# Patient Record
Sex: FEMALE | Race: OTHER | HISPANIC OR LATINO
[De-identification: names, ages, dates, MRNs, and addresses within clinical notes are randomized per-mention and may not be internally consistent; named-entity substitution may affect disease eponyms.]

---

## 2022-08-09 PROBLEM — Z00.00 ENCOUNTER FOR PREVENTIVE HEALTH EXAMINATION: Status: ACTIVE | Noted: 2022-08-09

## 2022-08-10 ENCOUNTER — APPOINTMENT (OUTPATIENT)
Dept: SURGERY | Facility: CLINIC | Age: 37
End: 2022-08-10

## 2022-08-10 VITALS
DIASTOLIC BLOOD PRESSURE: 68 MMHG | BODY MASS INDEX: 38.45 KG/M2 | HEART RATE: 81 BPM | HEIGHT: 67 IN | WEIGHT: 245 LBS | SYSTOLIC BLOOD PRESSURE: 100 MMHG | OXYGEN SATURATION: 98 %

## 2022-08-10 DIAGNOSIS — Z78.9 OTHER SPECIFIED HEALTH STATUS: ICD-10-CM

## 2022-08-10 DIAGNOSIS — Z83.3 FAMILY HISTORY OF DIABETES MELLITUS: ICD-10-CM

## 2022-08-10 DIAGNOSIS — Z86.718 PERSONAL HISTORY OF OTHER VENOUS THROMBOSIS AND EMBOLISM: ICD-10-CM

## 2022-08-10 PROCEDURE — 99204 OFFICE O/P NEW MOD 45 MIN: CPT

## 2022-08-10 NOTE — CONSULT LETTER
[Dear  ___] : Dear  [unfilled], [Consult Letter:] : I had the pleasure of evaluating your patient, [unfilled]. [Please see my note below.] : Please see my note below. [Consult Closing:] : Thank you very much for allowing me to participate in the care of this patient.  If you have any questions, please do not hesitate to contact me. [Sincerely,] : Sincerely, [FreeTextEntry3] : Osmin

## 2022-08-10 NOTE — HISTORY OF PRESENT ILLNESS
[de-identified] : Ms. KRZYSZTOF HOWELL  presents today  for bariatric surgery consultation. she has a long-standing history of severe obesity refractory to multiple prior attempts at weight loss including conservative treatments of diet and exercise.  Ms. KRZYSZTOF HOWELL has been obese since she had her child 6 years ago and the most weight that she  has been able to lose by any means is negligible.   Previous weight loss efforts include: Calorie-counting, restricted intake. Ms. HOWELL has limited capacity for exercise due to excess weight.  she  feels that weight loss surgery is the only option at this point for effective and clinically meaningful weight loss. she  is interested in gastric bypass. \par \par Patient also has a history of DVT, which was thought to be attributable to transdermal contraceptive patches.  She was followed and had a full work-up by hematologist which was negative for any factor deficiencies or other hematologic cause.  She is no longer on any anticoagulation.

## 2022-08-10 NOTE — ASSESSMENT
[FreeTextEntry1] : \par Patient with a BMI of * which places patient in the morbidly obese category.  Patient also suffers from ***  .  This has directly contributed to patient's  current medical conditions as well as, a decreased quality of life.  Patient has very little chance of successfully losing and maintaining a significant amount of weight with non-surgical management, and would benefit from surgical intervention.  Patient meets the criteria for weight loss surgery as defined in the NIH consensus statement, surgery is medically necessary. \par Given patient’s current BMI and obesity-related comorbidities, I feel the patient is a candidate for and would benefit from weight loss surgery, as all prior attempts by non-surgical means have been futile.\par \par VTE Risk Calculation:\par \par Does patient have PERSONAL history of VTE? Y\par Does patient have PERSONAL history of myocardial infarction, cardiac stent, or acute coronary syndrome? N\par \par Does patient have FAMILY history of VTE? N\par Does patient have FAMILY history of myocardial infarction, cardiac stent, or acute coronary syndrome? N\par \par \par \par

## 2022-08-10 NOTE — REVIEW OF SYSTEMS
[Recent Change In Weight] : ~T recent weight change [Negative] : Allergic/Immunologic [Patient Intake Form Reviewed] : Patient intake form was reviewed [Fever] : no fever [Chills] : no chills [Dysphagia] : no dysphagia [Odynophagia] : no odynophagia [Abdominal Pain] : no abdominal pain [Reflux/Heartburn] : no reflux/ heartburn

## 2022-08-10 NOTE — PHYSICAL EXAM
[Obese, well nourished, in no acute distress] : obese, well nourished, in no acute distress [Normal] : affect appropriate [de-identified] : Obese, protuberant. Soft, nontender, nondistended, no rebound or guarding.

## 2022-08-10 NOTE — PLAN
[FreeTextEntry1] : \par I have had a lengthy conversation with the patient and have discussed my impression and treatment plan with the patient.  \par The risks, benefits and alternatives, including laparoscopic gastric bypass, and laparoscopic vertical sleeve gastrectomy, were discussed at length and all of his questions were answered. The patient appears to understand and wishes to proceed.\par \par The patient was given the following instructions:\par \par 1.	Patient needs a complete medical evaluation including echocardiogram, stress test, pulmonary function test and evaluation for sleep apnea.\par \par 2.	Patient needs evaluation by GI including an upper endoscopy.\par \par 3.	Patient needs nutritional and psychological evaluations.\par \par 4.	Patient must attend a preoperative information meeting.\par \par 5.	Patient must undergo a right upper quadrant ultrasound, if there is no history of prior cholecystectomy.\par \par ·	Screening for Obstructive Sleep apnea .  Patient does meet criteria for polysomnography testing\par ·	Functional Assessment: Completely independent\par ·	Smoking:  Patient does not smoke.  Smoking cessation counseling/resources discussed (if indicated)\par ·	Substance Abuse:  Patient does not report use of illicit substances.  Counseling/resources discussed (if indicated)\par \par \par The weight loss surgery information packet as well as the nutrition education packet have been reviewed and given to the patient, and I provided and reviewed detailed documents addressing these same topics. I have provided literature about the procedures and encouraged the patient to further research the surgeries, and patient feels well informed.   I also provided patient with detailed information regarding the pre-operative requirements with respect to testing, medical, nutritional and psychological evaluations and documentation of same.  \par \par Also discussed was importance of taking supplements and attending regular follow-up.  I reviewed importance of behavioral modification and follow-up in order to optimize outcomes and avoid complications. The patient is aware of the expected length of stay and discharge plan for a sleeve gastrectomy.  I encouraged the patient to maintain a diet and exercise program to promote optimum health for the surgical procedure and post-op course. \par \par The patient clearly understood that surgery would only be scheduled if there are no medical or psychiatric contraindications and that follow up pre-operative office visits are required.  Patient agrees to begin a multi-disciplinary evaluation as discussed and provide required documentation and progress.  I informed patient that once all testing and evaluations (as deemed necessary) are completed, reviewed, and documentation received, this information to justify medical necessity for weight loss surgery will be submitted to insurance for pre-certification.  \par \par Patient will begin the pre-operative process with a visit to PCP, for whom detailed information regarding the necessary evaluations and documentation of obesity-related medical care was given to patient to share with PCP.  \par \par The patient had the opportunity to ask pertinent questions which were answered.  All of patient’s questions and concerns were addressed to patient’s satisfaction, and patient verbalized an understanding of the information discussed.\par

## 2022-09-13 ENCOUNTER — APPOINTMENT (OUTPATIENT)
Dept: GASTROENTEROLOGY | Facility: CLINIC | Age: 37
End: 2022-09-13

## 2022-09-13 VITALS
TEMPERATURE: 97.8 F | HEART RATE: 93 BPM | WEIGHT: 245 LBS | SYSTOLIC BLOOD PRESSURE: 126 MMHG | OXYGEN SATURATION: 97 % | DIASTOLIC BLOOD PRESSURE: 78 MMHG | BODY MASS INDEX: 38.45 KG/M2 | HEIGHT: 67 IN

## 2022-09-13 PROCEDURE — 99204 OFFICE O/P NEW MOD 45 MIN: CPT

## 2022-09-13 NOTE — PHYSICAL EXAM
[Alert] : alert [Normal Voice/Communication] : normal voice/communication [Healthy Appearing] : healthy appearing [No Acute Distress] : no acute distress [Sclera] : the sclera and conjunctiva were normal [Hearing Threshold Finger Rub Not [place]] : hearing was normal [Normal Lips/Gums] : the lips and gums were normal [Oropharynx] : the oropharynx was normal [Normal Appearance] : the appearance of the neck was normal [No Neck Mass] : no neck mass was observed [No Respiratory Distress] : no respiratory distress [No Acc Muscle Use] : no accessory muscle use [Respiration, Rhythm And Depth] : normal respiratory rhythm and effort [Auscultation Breath Sounds / Voice Sounds] : lungs were clear to auscultation bilaterally [Heart Rate And Rhythm] : heart rate was normal and rhythm regular [Normal S1, S2] : normal S1 and S2 [Murmurs] : no murmurs [Bowel Sounds] : normal bowel sounds [Abdomen Tenderness] : non-tender [No Masses] : no abdominal mass palpated [Abdomen Soft] : soft [] : no hepatosplenomegaly [Abnormal Walk] : normal gait [No Joint Swelling] : no joint swelling seen [Oriented To Time, Place, And Person] : oriented to person, place, and time

## 2022-09-20 NOTE — HISTORY OF PRESENT ILLNESS
[FreeTextEntry1] : This is a 36 year female here for bariatric surgery clearance. Referred to us by Dr. Doss. PMH of DM,HLD, ENZO. Denies a family history of colon CA, gastric CA, high risk polyps, Inflammatory and autoimmune disease. Patient denies any difficulty swallowing or reflux. No N&V or abdominal pain. No blood or dark tarry stool. Normal caliber. Weight stable. Never had an EGD or a colonoscopy\par Smoke/Etoh: none \par Wt  245\par BMI 38.37\par \par

## 2022-09-20 NOTE — ASSESSMENT
[FreeTextEntry1] : This is a 36 year female here for bariatric surgery clearance.\par \par My plan\par EGD\par Covid swab\par \par Risks, benefits, and alternatives of EGD  discussed at length with patient including but not limited to bleeding , perforation, anesthesia related complication, aspiration, etc. Patient expressed understanding.\par \par EGD for evaluation of polyps, tumors, nodules with +/- biopsy and or cauterization w/ and or w/o clipping. \par

## 2022-10-07 NOTE — ASU PATIENT PROFILE, ADULT - FALL HARM RISK - UNIVERSAL INTERVENTIONS
Bed in lowest position, wheels locked, appropriate side rails in place/Call bell, personal items and telephone in reach/Instruct patient to call for assistance before getting out of bed or chair/Non-slip footwear when patient is out of bed/[place] to call system/Physically safe environment - no spills, clutter or unnecessary equipment/Purposeful Proactive Rounding/Room/bathroom lighting operational, light cord in reach

## 2022-10-10 ENCOUNTER — TRANSCRIPTION ENCOUNTER (OUTPATIENT)
Age: 37
End: 2022-10-10

## 2022-10-10 ENCOUNTER — APPOINTMENT (OUTPATIENT)
Dept: GASTROENTEROLOGY | Facility: HOSPITAL | Age: 37
End: 2022-10-10

## 2022-10-10 ENCOUNTER — OUTPATIENT (OUTPATIENT)
Dept: OUTPATIENT SERVICES | Facility: HOSPITAL | Age: 37
LOS: 1 days | End: 2022-10-10
Payer: COMMERCIAL

## 2022-10-10 ENCOUNTER — APPOINTMENT (OUTPATIENT)
Dept: BARIATRICS | Facility: CLINIC | Age: 37
End: 2022-10-10

## 2022-10-10 VITALS
TEMPERATURE: 98 F | DIASTOLIC BLOOD PRESSURE: 72 MMHG | HEART RATE: 90 BPM | RESPIRATION RATE: 16 BRPM | OXYGEN SATURATION: 98 % | SYSTOLIC BLOOD PRESSURE: 128 MMHG | WEIGHT: 244.93 LBS | HEIGHT: 67 IN

## 2022-10-10 VITALS
SYSTOLIC BLOOD PRESSURE: 109 MMHG | OXYGEN SATURATION: 98 % | HEART RATE: 92 BPM | RESPIRATION RATE: 19 BRPM | DIASTOLIC BLOOD PRESSURE: 62 MMHG

## 2022-10-10 DIAGNOSIS — Z98.891 HISTORY OF UTERINE SCAR FROM PREVIOUS SURGERY: Chronic | ICD-10-CM

## 2022-10-10 DIAGNOSIS — E66.01 MORBID (SEVERE) OBESITY DUE TO EXCESS CALORIES: ICD-10-CM

## 2022-10-10 DIAGNOSIS — Z98.890 OTHER SPECIFIED POSTPROCEDURAL STATES: Chronic | ICD-10-CM

## 2022-10-10 DIAGNOSIS — Z90.89 ACQUIRED ABSENCE OF OTHER ORGANS: Chronic | ICD-10-CM

## 2022-10-10 LAB
GLUCOSE BLDC GLUCOMTR-MCNC: 105 MG/DL — HIGH (ref 70–99)
HCG UR QL: NEGATIVE — SIGNIFICANT CHANGE UP
SARS-COV-2 N GENE NPH QL NAA+PROBE: NOT DETECTED

## 2022-10-10 PROCEDURE — 88305 TISSUE EXAM BY PATHOLOGIST: CPT

## 2022-10-10 PROCEDURE — 88312 SPECIAL STAINS GROUP 1: CPT | Mod: 26

## 2022-10-10 PROCEDURE — 88312 SPECIAL STAINS GROUP 1: CPT

## 2022-10-10 PROCEDURE — 82962 GLUCOSE BLOOD TEST: CPT

## 2022-10-10 PROCEDURE — 43239 EGD BIOPSY SINGLE/MULTIPLE: CPT

## 2022-10-10 PROCEDURE — 88305 TISSUE EXAM BY PATHOLOGIST: CPT | Mod: 26

## 2022-10-10 PROCEDURE — 81025 URINE PREGNANCY TEST: CPT

## 2022-10-10 PROCEDURE — ZZZZZ: CPT

## 2022-10-10 RX ORDER — SODIUM CHLORIDE 9 MG/ML
500 INJECTION INTRAMUSCULAR; INTRAVENOUS; SUBCUTANEOUS
Refills: 0 | Status: DISCONTINUED | OUTPATIENT
Start: 2022-10-10 | End: 2022-10-24

## 2022-10-10 NOTE — ASU DISCHARGE PLAN (ADULT/PEDIATRIC) - NS MD DC FALL RISK RISK
For information on Fall & Injury Prevention, visit: [email or web address] OR  [email or web address] OR  https://www.cdc.gov/steadi/patient.html

## 2022-10-12 PROBLEM — E78.5 HYPERLIPIDEMIA, UNSPECIFIED: Chronic | Status: ACTIVE | Noted: 2022-10-07

## 2022-10-12 PROBLEM — E11.9 TYPE 2 DIABETES MELLITUS WITHOUT COMPLICATIONS: Chronic | Status: ACTIVE | Noted: 2022-10-07

## 2022-10-12 LAB — SURGICAL PATHOLOGY STUDY: SIGNIFICANT CHANGE UP

## 2022-11-10 ENCOUNTER — APPOINTMENT (OUTPATIENT)
Dept: BARIATRICS | Facility: CLINIC | Age: 37
End: 2022-11-10

## 2022-11-10 PROCEDURE — ZZZZZ: CPT

## 2022-11-21 ENCOUNTER — NON-APPOINTMENT (OUTPATIENT)
Age: 37
End: 2022-11-21

## 2022-11-21 ENCOUNTER — APPOINTMENT (OUTPATIENT)
Dept: CARDIOLOGY | Facility: CLINIC | Age: 37
End: 2022-11-21

## 2022-11-21 VITALS
DIASTOLIC BLOOD PRESSURE: 81 MMHG | BODY MASS INDEX: 38.45 KG/M2 | SYSTOLIC BLOOD PRESSURE: 138 MMHG | WEIGHT: 245 LBS | OXYGEN SATURATION: 99 % | HEIGHT: 67 IN | HEART RATE: 118 BPM | TEMPERATURE: 97.3 F

## 2022-11-21 DIAGNOSIS — Z01.818 ENCOUNTER FOR OTHER PREPROCEDURAL EXAMINATION: ICD-10-CM

## 2022-11-21 DIAGNOSIS — E78.00 PURE HYPERCHOLESTEROLEMIA, UNSPECIFIED: ICD-10-CM

## 2022-11-21 PROCEDURE — 99204 OFFICE O/P NEW MOD 45 MIN: CPT | Mod: 25

## 2022-11-21 PROCEDURE — 93000 ELECTROCARDIOGRAM COMPLETE: CPT | Mod: NC

## 2022-11-23 PROBLEM — Z01.818 PRE-OP TESTING: Status: ACTIVE | Noted: 2022-09-13

## 2022-11-23 PROBLEM — E78.00 HIGH CHOLESTEROL: Status: ACTIVE | Noted: 2022-08-10

## 2022-11-23 RX ORDER — ALBUTEROL SULFATE 90 UG/1
108 (90 BASE) INHALANT RESPIRATORY (INHALATION)
Qty: 7 | Refills: 0 | Status: ACTIVE | COMMUNITY
Start: 2022-09-28

## 2022-11-23 NOTE — ASSESSMENT
[FreeTextEntry1] : 38 yo F with asthma, HLD, DM, DVT and ENZO who presents for preprocedural evaluation prior to bariatric surgery. Patient is euvolemic on exam, and reports being able to perform > 4 METs without symptoms.\par \par 1.  Preprocedural evaluation: Patient's Revised Cardiac Risk Index (RCRI) score is 0 (Class I risk) and Craft score is 0.12% risk. Patient is at low-intermediate risk of  adverse perioperative cardiac events undergoing intermediate risk surgery. No further cardiac testing is needed prior to patient's surgery.\par \par 2. HLD: On atorvastatin\par \par 3. Family history of SCD: While patient's mother having an MI at age 46 is possible, the word fulminant is frequently applied to myocarditis, which can result in a similar outcome although has less of a genetic component.\par – In this patient's case, she already underwent genetic testing and is currently on atorvastatin which is used in primary prevention of ASCVD.  As such, aside from lifestyle modifications including recommendations for diet and exercise, as well as her upcoming bariatric surgery which may positively impact her hyperlipidemia and diabetes, no additional action needs to be taken at this point.\par – Patient was referred to follow-up with preventive cardiology (can be done after her surgery) to see whether any additional intervention may be helpful\par – Patient was also instructed to follow-up here in the office after her surgery\par

## 2022-11-23 NOTE — HISTORY OF PRESENT ILLNESS
[FreeTextEntry1] : 36 yo F with asthma, HLD, DM, and DVT in 2019 (negative work-up by hematology), obesity, and ENZO pending CPAP machine who presents for preprocedural evaluation prior to bariatric surgery.  Patient's family history is noted for her mother having sudden cardiac death at age 46.  Patient reports that she previously underwent an echocardiogram as well as genetic testing at [place], without any positive findings. \par \par Patient states that her mother had an MI, based on an autopsy that was performed in her home country.  The patient remembers seeing the word "fulminant" in the report, which is frequently applied to "fulminant myocarditis."\par \par Patient reports he feels well overall, and is able to use her stationary bike for up to 20 minutes at a time without feeling any chest pain, dyspnea, palpitations, lightheadedness, or syncope. Denies LE edema, orthopnea, or PND. Lives in a 4th floor walk-up and ascends multiple flights of stairs daily without issue.\par \par

## 2023-01-04 ENCOUNTER — APPOINTMENT (OUTPATIENT)
Dept: SURGERY | Facility: CLINIC | Age: 38
End: 2023-01-04
Payer: COMMERCIAL

## 2023-01-04 VITALS
WEIGHT: 252 LBS | BODY MASS INDEX: 39.55 KG/M2 | HEART RATE: 123 BPM | HEIGHT: 67 IN | SYSTOLIC BLOOD PRESSURE: 119 MMHG | DIASTOLIC BLOOD PRESSURE: 74 MMHG

## 2023-01-04 DIAGNOSIS — E11.9 TYPE 2 DIABETES MELLITUS W/OUT COMPLICATIONS: ICD-10-CM

## 2023-01-04 DIAGNOSIS — E66.01 MORBID (SEVERE) OBESITY DUE TO EXCESS CALORIES: ICD-10-CM

## 2023-01-04 DIAGNOSIS — K21.9 GASTRO-ESOPHAGEAL REFLUX DISEASE W/OUT ESOPHAGITIS: ICD-10-CM

## 2023-01-04 PROCEDURE — 99214 OFFICE O/P EST MOD 30 MIN: CPT

## 2023-01-04 NOTE — HISTORY OF PRESENT ILLNESS
[de-identified] : Patient is here for monthly pre-operative follow-up in preparation for bariatric surgery. Patient is making good food choices, working on eating smaller portions and becoming more mindful. Patient has made a concerted effort to eat more balanced and nutritionally sound meals, and to engage in some level of physical activity on a regular basis. Patient continues to struggle with weight loss despite continuous concerted efforts since the prior visit. Patient presents for review and discussion of informed consent in preparation for planned sleeve gastrectomy. Patient  has completed all of the required pre-operative testing and evaluations, and is ready to proceed with a  gastric bypass.  [de-identified] : She recently had her IUD removed due to excessive uterine bleeding.  She has had no further issues with bleeding since the device was removed.

## 2023-01-04 NOTE — ASSESSMENT
[FreeTextEntry1] : Patient with BMI of 40 which places patient in the morbidly obese category. This has directly contributed to patient's current medical conditions as well as, a decreased quality of life. Patient has very little chance of successfully losing and maintaining a significant amount of weight with non-surgical management, and would benefit from surgical intervention. Patient meets the criteria for weight loss surgery as defined in the NIH consensus statement, surgery is medically necessary. \par Given patient’s current BMI and obesity-related comorbidities, I feel the patient remains a candidate for and would benefit from weight loss surgery, as all prior attempts by non-surgical means have been futile.  Patient has put a great deal of thought and consideration into weight loss surgery, and remains  committed to proceeding with weight loss surgery.\par \par

## 2023-01-04 NOTE — CONSULT LETTER
[Dear  ___] : Dear  [unfilled], [Courtesy Letter:] : I had the pleasure of seeing your patient, [unfilled], in my office today. [Please see my note below.] : Please see my note below. [Consult Closing:] : Thank you very much for allowing me to participate in the care of this patient.  If you have any questions, please do not hesitate to contact me. [Sincerely,] : Sincerely, [FreeTextEntry3] : Osmin

## 2023-01-04 NOTE — PHYSICAL EXAM
[Obese, well nourished, in no acute distress] : obese, well nourished, in no acute distress [Normal] : affect appropriate [de-identified] : Obese, protuberant. Soft, nontender, nondistended, no rebound or guarding.

## 2023-01-04 NOTE — PLAN
[FreeTextEntry1] : I have had a lengthy and detailed conversation with the patient and have discussed my impressions and treatment plans with the patient. \par Informed consent and potential risks , benefits  and alternatives to the planned surgery were discussed in depth, including but not limited to:  bleeding, infection; seroma, hematoma, abscess; intestinal, gastric, vascular or other visceral or solid organ injury; leak;  injury to other adjacent or surrounding structures or organs; new or worsening reflux/heartburn; inability to complete the intended procedure or need to abort the procedure; inadequate weight loss or weight regain;  port site or incisional hernia; need for reoperation or other procedure; perioperative myocardial infarction, stroke, deep vein thrombosis, pulmonary embolus, pneumonia and death.  All surgical options were discussed including non-surgical treatments. The patient remains interested in a gastric bypass for weight loss. \par \par The weight loss surgery education packet as well as the nutrition education packet have been reviewed and given to the patient previously, and I also provided patient with detailed information regarding the post-operative instructions and expectations.  \par Also discussed was importance of taking supplements and attending regular follow-up. I reviewed importance of behavioral modification and follow-up in order to optimize outcomes and avoid complications. \par The patient is aware of the expected length of stay and discharge plan for the planned procedure. I encouraged the patient to maintain a diet and exercise program to promote optimum health for the surgical procedure and post-op course. \par I informed patient that all testing and evaluations and documentation have been received and reviewed.  This information to justify medical necessity for weight loss surgery will be submitted to insurance for pre-certification. \par \par We will plan to proceed with surgery at a mutually agreed-upon date, pending any required insurance pre-certification or pre-approval.  \par Patient agrees to obtain any outstanding necessary pre-operative evaluations and clearances that may be required for pre-surgical optimization.  Patient will also start the pre-operative diet at least two weeks prior to the scheduled surgery, and understands that failure to do so may result in an inability to perform the surgery as scheduled. \par \par The patient had the opportunity to ask pertinent questions, and all of patient’s questions and concerns were addressed to patient’s satisfaction.  Patient verbalized an understanding of the information discussed, and wishes to proceed with surgery. Informed consent for gastric bypass reviewed with and signed by patient.\par \par

## 2023-01-04 NOTE — DATA REVIEWED
[FreeTextEntry1] : Nutrition: Patient optimized\par Psychology: Patient optimized\par Cardiology: RCRI score of 0, no further testing necessary; patient optimized\par Pulmonary: PFTs normal, patient optimized\par GI: No hiatal hernia seen.  H. pylori negative\par \par \par  Patient:   KRZYSZTOF HOWELL\par Accession:                             [id number]\par \par Collected Date/Time:                   10/10/2022 08:38 EDT\par Received Date/Time:                    10/10/2022 18:00 EDT\par \par Surgical Pathology Report - Auth (Verified)\par \par Specimen(s) Submitted\par 1  Antrum biopsy\par \par Final Diagnosis\par \par Gastric antrum; biopsy:\par - Chronic inactive gastritis.\par - Cresyl violet stain is negative for H. pylori-like microorganisms.\par \par Verified by: Ivelisse Trujillo MD\par (Electronic Signature)\par Reported on: 10/12/22 12:39 EDT, [address]\par Phone: [phone]   Fax: [phone]\par _________________________________________________________________\par \par Clinical Information\par \par 36-year-old for prebariatric surgery\par \par Gross Description\par Received in formalin labeled   "antrum" are two, tan-pink, soft tissue\par fragments measuring 0.3 cm and 0.5 cm in greatest dimension. Entirely in\par one cassette: 1A.\par \par Luis M Severe 10/11/2022 01:11 PM\par \par Disclaimer\par In addition to other data that may appear on the specimen containers, all\par labels have been inspected to confirm the presence of the patient's name\par and date of birth.\par \par Histological processing performed at [facility], [place] [address].

## 2023-01-09 LAB
24R-OH-CALCIDIOL SERPL-MCNC: 55.9 PG/ML
ALBUMIN SERPL ELPH-MCNC: 4.6 G/DL
ALP BLD-CCNC: 88 U/L
ALT SERPL-CCNC: 60 U/L
ANION GAP SERPL CALC-SCNC: 14 MMOL/L
APTT BLD: 28.4 SEC
AST SERPL-CCNC: 37 U/L
BASOPHILS # BLD AUTO: 0.04 K/UL
BASOPHILS NFR BLD AUTO: 0.4 %
BILIRUB SERPL-MCNC: 0.7 MG/DL
BUN SERPL-MCNC: 4 MG/DL
CALCIUM SERPL-MCNC: 9.7 MG/DL
CALCIUM SERPL-MCNC: 9.7 MG/DL
CHLORIDE SERPL-SCNC: 104 MMOL/L
CHOLEST SERPL-MCNC: 161 MG/DL
CO2 SERPL-SCNC: 22 MMOL/L
CREAT SERPL-MCNC: 0.75 MG/DL
EGFR: 106 ML/MIN/1.73M2
EOSINOPHIL # BLD AUTO: 0.36 K/UL
EOSINOPHIL NFR BLD AUTO: 3.4 %
ESTIMATED AVERAGE GLUCOSE: 128 MG/DL
FOLATE SERPL-MCNC: 13.3 NG/ML
GLUCOSE SERPL-MCNC: 112 MG/DL
HBA1C MFR BLD HPLC: 6.1 %
HCT VFR BLD CALC: 39.1 %
HDLC SERPL-MCNC: 46 MG/DL
HGB BLD-MCNC: 12 G/DL
IMM GRANULOCYTES NFR BLD AUTO: 0.2 %
INR PPP: 1.13 RATIO
IRON SERPL-MCNC: 57 UG/DL
LDLC SERPL CALC-MCNC: 100 MG/DL
LYMPHOCYTES # BLD AUTO: 2.78 K/UL
LYMPHOCYTES NFR BLD AUTO: 26.4 %
MAGNESIUM SERPL-MCNC: 1.8 MG/DL
MAN DIFF?: NORMAL
MCHC RBC-ENTMCNC: 27.3 PG
MCHC RBC-ENTMCNC: 30.7 GM/DL
MCV RBC AUTO: 88.9 FL
MONOCYTES # BLD AUTO: 0.8 K/UL
MONOCYTES NFR BLD AUTO: 7.6 %
NEUTROPHILS # BLD AUTO: 6.52 K/UL
NEUTROPHILS NFR BLD AUTO: 62 %
NONHDLC SERPL-MCNC: 115 MG/DL
PARATHYROID HORMONE INTACT: 33 PG/ML
PLATELET # BLD AUTO: 317 K/UL
POTASSIUM SERPL-SCNC: 4.1 MMOL/L
PROT SERPL-MCNC: 7.7 G/DL
PT BLD: 13.1 SEC
RBC # BLD: 4.4 M/UL
RBC # FLD: 13.9 %
SODIUM SERPL-SCNC: 139 MMOL/L
TRIGL SERPL-MCNC: 73 MG/DL
TSH SERPL-ACNC: 1.38 UIU/ML
VIT B12 SERPL-MCNC: 644 PG/ML
WBC # FLD AUTO: 10.52 K/UL

## 2023-02-03 ENCOUNTER — OUTPATIENT (OUTPATIENT)
Dept: OUTPATIENT SERVICES | Facility: HOSPITAL | Age: 38
LOS: 1 days | End: 2023-02-03
Payer: COMMERCIAL

## 2023-02-03 VITALS
OXYGEN SATURATION: 100 % | HEIGHT: 67 IN | RESPIRATION RATE: 18 BRPM | SYSTOLIC BLOOD PRESSURE: 147 MMHG | WEIGHT: 240.97 LBS | HEART RATE: 84 BPM | TEMPERATURE: 98 F | DIASTOLIC BLOOD PRESSURE: 82 MMHG

## 2023-02-03 DIAGNOSIS — G47.33 OBSTRUCTIVE SLEEP APNEA (ADULT) (PEDIATRIC): ICD-10-CM

## 2023-02-03 DIAGNOSIS — J45.909 UNSPECIFIED ASTHMA, UNCOMPLICATED: ICD-10-CM

## 2023-02-03 DIAGNOSIS — K21.9 GASTRO-ESOPHAGEAL REFLUX DISEASE WITHOUT ESOPHAGITIS: ICD-10-CM

## 2023-02-03 DIAGNOSIS — E66.01 MORBID (SEVERE) OBESITY DUE TO EXCESS CALORIES: ICD-10-CM

## 2023-02-03 DIAGNOSIS — Z90.89 ACQUIRED ABSENCE OF OTHER ORGANS: Chronic | ICD-10-CM

## 2023-02-03 DIAGNOSIS — Z98.890 OTHER SPECIFIED POSTPROCEDURAL STATES: Chronic | ICD-10-CM

## 2023-02-03 DIAGNOSIS — Z01.818 ENCOUNTER FOR OTHER PREPROCEDURAL EXAMINATION: ICD-10-CM

## 2023-02-03 DIAGNOSIS — E78.5 HYPERLIPIDEMIA, UNSPECIFIED: ICD-10-CM

## 2023-02-03 DIAGNOSIS — Z98.891 HISTORY OF UTERINE SCAR FROM PREVIOUS SURGERY: Chronic | ICD-10-CM

## 2023-02-03 DIAGNOSIS — E11.9 TYPE 2 DIABETES MELLITUS WITHOUT COMPLICATIONS: ICD-10-CM

## 2023-02-03 LAB — BLD GP AB SCN SERPL QL: SIGNIFICANT CHANGE UP

## 2023-02-03 PROCEDURE — G0463: CPT

## 2023-02-03 RX ORDER — SODIUM CHLORIDE 9 MG/ML
3 INJECTION INTRAMUSCULAR; INTRAVENOUS; SUBCUTANEOUS EVERY 8 HOURS
Refills: 0 | Status: DISCONTINUED | OUTPATIENT
Start: 2023-02-13 | End: 2023-02-15

## 2023-02-03 NOTE — H&P PST ADULT - PSYCHIATRIC
details… normal/normal affect/alert and oriented x3/normal behavior normal affect/alert and oriented x3/normal behavior

## 2023-02-03 NOTE — H&P PST ADULT - PATIENT'S SEXUAL ORIENTATION
Abdomen , soft, nontender, nondistended , no guarding or rigidity , no masses palpable , normal bowel sounds , Liver and Spleen , no hepatomegaly present , no hepatosplenomegaly , liver nontender , spleen not palpable Heterosexual

## 2023-02-03 NOTE — H&P PST ADULT - ASSESSMENT
36 y/o female with h/o prediabetes, HLD, Vitamin D deficiency, ENZO    is diagnosed with morbid (severe) obesity due to excess calories. She is scheduled for robotic assisted gastric bypass on 2/13/23 38 y/o female with h/o asthma, GERD, T2DM, HLD, Vitamin D deficiency, ENZO (mild dx 10/2022 - not prescribed CPAP) is diagnosed with morbid (severe) obesity due to excess calories 36 y/o female with h/o asthma, GERD, Seasonal Allergies, T2DM, HLD, Vitamin D deficiency, ENZO (mild dx 10/2022 - not prescribed CPAP) is diagnosed with morbid (severe) obesity due to excess calories

## 2023-02-03 NOTE — H&P PST ADULT - PROBLEM SELECTOR PLAN 6
Sleep study completed 10/2022  Mild ENZO (pending CPAP)  Followed by pulmonologist - optimized for surgery  Recommend ENZO precautions perioperatively

## 2023-02-03 NOTE — H&P PST ADULT - GASTROINTESTINAL
details… soft/nontender/nondistended/normal active bowel sounds/no guarding/no rigidity/no organomegaly/no palpable enrique/no masses palpable

## 2023-02-03 NOTE — H&P PST ADULT - PROBLEM SELECTOR PLAN 1
Scheduled for robotic assisted gastric bypass on 2/13/23  Preoperative instructions discussed and given to patient.   Instructed to schedule COVID 19 test 3 days prior to surgery.   Discussed preprocedure skin preparation using  chlorhexidine gluconate 4% solution three days prior to  surgery.  Instructed patient to avoid aspirin and aspirin products, over the counter medications such as vitamins and herbal medications, one week prior to surgery.  Take Tylenol as needed for pain  Patient verbalized understanding of instructions

## 2023-02-03 NOTE — H&P PST ADULT - HISTORY OF PRESENT ILLNESS
36 y/o female with h/o prediabetes, HLD, Vitamin D deficiency, ENZO    is diagnosed with morbid (severe) obesity due to excess calories. She is scheduled for robotic assisted gastric bypass on 2/13/23 38 y/o female with h/o T2DM, HLD, Vitamin D deficiency, ENZO (mild - dx 10/2022) is diagnosed with morbid (severe) obesity due to excess calories. She is scheduled for robotic assisted gastric bypass on 2/13/23 36 y/o female with h/o mild persistent asthma, seasonal allergies, dyspnea on exertion associated with asthma, T2DM, HLD, Vitamin D deficiency, ENZO (mild - dx 10/2022, not prescribed cpap) DVT (while on birth-control with negative workup by hematology in 2019,  is diagnosed with morbid (severe) obesity due to excess calories. She is scheduled for robotic assisted gastric bypass on 2/13/23

## 2023-02-03 NOTE — H&P PST ADULT - NSICDXFAMILYHX_GEN_ALL_CORE_FT
FAMILY HISTORY:  Mother  Still living? Yes, Estimated age: Age Unknown  FHx: heart disease, Age at diagnosis: Age Unknown    Grandparent  Still living? Unknown  FHx: heart disease, Age at diagnosis: Age Unknown    Aunt  Still living? No  FHx: heart disease, Age at diagnosis: Age Unknown     FAMILY HISTORY:  Mother  Still living? Yes, Estimated age: Age Unknown  FHx: heart disease, Age at diagnosis: Age Unknown    Grandparent  Still living? No  FH: type 2 diabetes, Age at diagnosis: Age Unknown  FHx: heart disease, Age at diagnosis: Age Unknown    Aunt  Still living? No  FHx: heart disease, Age at diagnosis: Age Unknown

## 2023-02-03 NOTE — H&P PST ADULT - ATTENDING COMMENTS
I have reviewed the history and physical – recorded 1 to 30 days prior the procedure – and I have re-examined the patient (so as to update any changes in the patient's health status), and I state that it is still appropriate with my corrections and/or amendments as documented.     Patient reports no interval changes to overall health status or medical history since our previous encounter.      I have had a lengthy conversation with the patient and have discussed my impressions and treatment plans with the patient.  Detailed informed consent and potential risks , benefits and alternatives to the planned surgery (robotic-assisted laparoscopic Leonardo-en-Y gastric bypass) were once again reinforced and reviewed, including but not limited to:  bleeding, infection, esophageal/gastric/intestinal or other visceral or solid organ injury; leak, sepsis, death; new onset or worsening reflux; inability to perform the intended procedure, inability to lose desired amount of weight, regain of weight; port site or incisional hernia, need for other procedure or re-operation, perioperative myocardial infarction, stroke, deep vein thrombosis, pulmonary embolus, pneumonia and death.  All surgical options were discussed including non-surgical treatments. The patient remains interested in a gastric bypass for weight loss.     The weight loss surgery education packet as well as the nutrition education packet have been reviewed and given to the patient previously, and I also provided patient with detailed information regarding the post-operative instructions and expectations.  The patient is aware of the expected immediate post operative course, length of stay and discharge plan for the gastric bypass procedure.   The patient had the opportunity to ask pertinent questions, and all of patient’s questions and concerns were addressed to patient’s satisfaction.  Patient verbalized an understanding of the information discussed, and wishes to proceed with surgery. Informed consent signed.

## 2023-02-03 NOTE — H&P PST ADULT - NSICDXPASTMEDICALHX_GEN_ALL_CORE_FT
PAST MEDICAL HISTORY:  HLD (hyperlipidemia)     ENZO (obstructive sleep apnea)     Pre-existing type 2 diabetes mellitus      PAST MEDICAL HISTORY:  DVT, lower extremity     GERD (gastroesophageal reflux disease)     HLD (hyperlipidemia)     Mild asthma     ENZO (obstructive sleep apnea)     Pre-existing type 2 diabetes mellitus     Seasonal allergies

## 2023-02-03 NOTE — H&P PST ADULT - NEGATIVE CARDIOVASCULAR SYMPTOMS
no chest pain/no palpitations/no dyspnea on exertion/no orthopnea/no paroxysmal nocturnal dyspnea/no peripheral edema/no claudication no chest pain/no palpitations/no orthopnea/no paroxysmal nocturnal dyspnea/no peripheral edema/no claudication

## 2023-02-03 NOTE — H&P PST ADULT - SKIN
warm and dry/color normal/normal/no rashes/no ulcers acanthosis nigricans/warm and dry/normal/no rashes/no ulcers

## 2023-02-03 NOTE — H&P PST ADULT - WILL THE PATIENT ACCEPT THE PFIZER COVID-19 VACCINE IF ELIGIBLE AND IT IS AVAILABLE?
BILATERAL DIGITAL SCREENING MAMMOGRAM with CAD: 11/22/17 15:24:00



CLINICAL: Routine screening.



COMPARISON:08/26/16, 03/13/15 and 12/20/13



FINDINGS: The breasts are heterogeneously dense, which may obscure 

small masses.  A right upper outer oval circumscribed mass or cyst 

requires additional imaging.No architectural distortion or suspicious 

calcifications.The left breast is negative.



IMPRESSION: Right upper outer mass or cyst requiring further workup.



BI-RADS CATEGORY:  0 -- Additional Imaging Evaluation Required



RECOMMENDATION: Recall for a targeted right upper outer breast breast 

ultrasound.  



ACR BI-RADS MAMMOGRAPHIC CODES:

0 = Needs additional imaging evaluation; 1 = Negative; 2 = Benign; 3 = 

Probably benign; 4 = Suspicious; 5 = Malignant; 6 = Known biopsy-proven 

malignancy



COMMENT:

      1.   Dense breast tissue, i.e., adenosis, fibrocystic 

            changes, etc., may obscure an underlying neoplasm.

      2.   Approximately 10% of cancers are not detected with

            mammography.

      3.   A negative mammography report should not delay biopsy 

            if a clinically suspicious mass is present.



COMMENT:

Patient follow-up letters are generated via our [employer] application.
No

## 2023-02-03 NOTE — H&P PST ADULT - CARDIOVASCULAR COMMENTS
h/o RAEGAND h/o HLD, DVT (while using birth control - negative workup by hematology in 2019), family h/o MI (mother at age 47y/o)

## 2023-02-12 ENCOUNTER — TRANSCRIPTION ENCOUNTER (OUTPATIENT)
Age: 38
End: 2023-02-12

## 2023-02-13 ENCOUNTER — APPOINTMENT (OUTPATIENT)
Dept: SURGERY | Facility: HOSPITAL | Age: 38
End: 2023-02-13

## 2023-02-13 ENCOUNTER — TRANSCRIPTION ENCOUNTER (OUTPATIENT)
Age: 38
End: 2023-02-13

## 2023-02-13 ENCOUNTER — INPATIENT (INPATIENT)
Facility: HOSPITAL | Age: 38
LOS: 1 days | Discharge: HOME | DRG: 621 | End: 2023-02-15
Attending: SURGERY | Admitting: SURGERY
Payer: COMMERCIAL

## 2023-02-13 VITALS
HEIGHT: 67 IN | SYSTOLIC BLOOD PRESSURE: 115 MMHG | DIASTOLIC BLOOD PRESSURE: 76 MMHG | TEMPERATURE: 99 F | HEART RATE: 93 BPM | OXYGEN SATURATION: 98 % | RESPIRATION RATE: 16 BRPM | WEIGHT: 246.04 LBS

## 2023-02-13 DIAGNOSIS — E66.01 MORBID (SEVERE) OBESITY DUE TO EXCESS CALORIES: ICD-10-CM

## 2023-02-13 DIAGNOSIS — Z98.891 HISTORY OF UTERINE SCAR FROM PREVIOUS SURGERY: Chronic | ICD-10-CM

## 2023-02-13 DIAGNOSIS — Z98.890 OTHER SPECIFIED POSTPROCEDURAL STATES: Chronic | ICD-10-CM

## 2023-02-13 DIAGNOSIS — Z90.89 ACQUIRED ABSENCE OF OTHER ORGANS: Chronic | ICD-10-CM

## 2023-02-13 LAB
ANION GAP SERPL CALC-SCNC: 13 MMOL/L — SIGNIFICANT CHANGE UP (ref 5–17)
BASOPHILS # BLD AUTO: 0.02 K/UL — SIGNIFICANT CHANGE UP (ref 0–0.2)
BASOPHILS NFR BLD AUTO: 0.1 % — SIGNIFICANT CHANGE UP (ref 0–2)
BLD GP AB SCN SERPL QL: SIGNIFICANT CHANGE UP
BUN SERPL-MCNC: 14 MG/DL — SIGNIFICANT CHANGE UP (ref 7–18)
CALCIUM SERPL-MCNC: 9 MG/DL — SIGNIFICANT CHANGE UP (ref 8.4–10.5)
CHLORIDE SERPL-SCNC: 103 MMOL/L — SIGNIFICANT CHANGE UP (ref 96–108)
CO2 SERPL-SCNC: 21 MMOL/L — LOW (ref 22–31)
CREAT SERPL-MCNC: 1.22 MG/DL — SIGNIFICANT CHANGE UP (ref 0.5–1.3)
EGFR: 59 ML/MIN/1.73M2 — LOW
EOSINOPHIL # BLD AUTO: 0.01 K/UL — SIGNIFICANT CHANGE UP (ref 0–0.5)
EOSINOPHIL NFR BLD AUTO: 0.1 % — SIGNIFICANT CHANGE UP (ref 0–6)
GLUCOSE BLDC GLUCOMTR-MCNC: 130 MG/DL — HIGH (ref 70–99)
GLUCOSE BLDC GLUCOMTR-MCNC: 134 MG/DL — HIGH (ref 70–99)
GLUCOSE BLDC GLUCOMTR-MCNC: 141 MG/DL — HIGH (ref 70–99)
GLUCOSE BLDC GLUCOMTR-MCNC: 144 MG/DL — HIGH (ref 70–99)
GLUCOSE BLDC GLUCOMTR-MCNC: 82 MG/DL — SIGNIFICANT CHANGE UP (ref 70–99)
GLUCOSE SERPL-MCNC: 160 MG/DL — HIGH (ref 70–99)
HCG UR QL: NEGATIVE — SIGNIFICANT CHANGE UP
HCT VFR BLD CALC: 38.3 % — SIGNIFICANT CHANGE UP (ref 34.5–45)
HGB BLD-MCNC: 12 G/DL — SIGNIFICANT CHANGE UP (ref 11.5–15.5)
IMM GRANULOCYTES NFR BLD AUTO: 0.4 % — SIGNIFICANT CHANGE UP (ref 0–0.9)
LYMPHOCYTES # BLD AUTO: 1.18 K/UL — SIGNIFICANT CHANGE UP (ref 1–3.3)
LYMPHOCYTES # BLD AUTO: 7.5 % — LOW (ref 13–44)
MCHC RBC-ENTMCNC: 26.7 PG — LOW (ref 27–34)
MCHC RBC-ENTMCNC: 31.3 GM/DL — LOW (ref 32–36)
MCV RBC AUTO: 85.1 FL — SIGNIFICANT CHANGE UP (ref 80–100)
MONOCYTES # BLD AUTO: 0.33 K/UL — SIGNIFICANT CHANGE UP (ref 0–0.9)
MONOCYTES NFR BLD AUTO: 2.1 % — SIGNIFICANT CHANGE UP (ref 2–14)
NEUTROPHILS # BLD AUTO: 14.21 K/UL — HIGH (ref 1.8–7.4)
NEUTROPHILS NFR BLD AUTO: 89.8 % — HIGH (ref 43–77)
NRBC # BLD: 0 /100 WBCS — SIGNIFICANT CHANGE UP (ref 0–0)
PLATELET # BLD AUTO: 318 K/UL — SIGNIFICANT CHANGE UP (ref 150–400)
POTASSIUM SERPL-MCNC: 3.9 MMOL/L — SIGNIFICANT CHANGE UP (ref 3.5–5.3)
POTASSIUM SERPL-SCNC: 3.9 MMOL/L — SIGNIFICANT CHANGE UP (ref 3.5–5.3)
RBC # BLD: 4.5 M/UL — SIGNIFICANT CHANGE UP (ref 3.8–5.2)
RBC # FLD: 13.7 % — SIGNIFICANT CHANGE UP (ref 10.3–14.5)
SODIUM SERPL-SCNC: 137 MMOL/L — SIGNIFICANT CHANGE UP (ref 135–145)
WBC # BLD: 15.81 K/UL — HIGH (ref 3.8–10.5)
WBC # FLD AUTO: 15.81 K/UL — HIGH (ref 3.8–10.5)

## 2023-02-13 PROCEDURE — S2900 ROBOTIC SURGICAL SYSTEM: CPT | Mod: NC

## 2023-02-13 PROCEDURE — 43644 LAP GASTRIC BYPASS/ROUX-EN-Y: CPT

## 2023-02-13 DEVICE — TISSEEL 4ML: Type: IMPLANTABLE DEVICE | Status: FUNCTIONAL

## 2023-02-13 DEVICE — XI STAPLER SUREFORM RELOAD 60 WHITE: Type: IMPLANTABLE DEVICE | Status: FUNCTIONAL

## 2023-02-13 RX ORDER — ONDANSETRON 8 MG/1
4 TABLET, FILM COATED ORAL EVERY 6 HOURS
Refills: 0 | Status: DISCONTINUED | OUTPATIENT
Start: 2023-02-13 | End: 2023-02-15

## 2023-02-13 RX ORDER — ACETAMINOPHEN 500 MG
1000 TABLET ORAL ONCE
Refills: 0 | Status: COMPLETED | OUTPATIENT
Start: 2023-02-13 | End: 2023-02-13

## 2023-02-13 RX ORDER — SCOPALAMINE 1 MG/3D
1 PATCH, EXTENDED RELEASE TRANSDERMAL ONCE
Refills: 0 | Status: COMPLETED | OUTPATIENT
Start: 2023-02-13 | End: 2023-02-13

## 2023-02-13 RX ORDER — GLUCAGON INJECTION, SOLUTION 0.5 MG/.1ML
1 INJECTION, SOLUTION SUBCUTANEOUS ONCE
Refills: 0 | Status: DISCONTINUED | OUTPATIENT
Start: 2023-02-13 | End: 2023-02-15

## 2023-02-13 RX ORDER — METFORMIN HYDROCHLORIDE 850 MG/1
1 TABLET ORAL
Qty: 0 | Refills: 0 | DISCHARGE

## 2023-02-13 RX ORDER — DEXTROSE 50 % IN WATER 50 %
25 SYRINGE (ML) INTRAVENOUS ONCE
Refills: 0 | Status: DISCONTINUED | OUTPATIENT
Start: 2023-02-13 | End: 2023-02-15

## 2023-02-13 RX ORDER — SODIUM CHLORIDE 9 MG/ML
1000 INJECTION, SOLUTION INTRAVENOUS
Refills: 0 | Status: DISCONTINUED | OUTPATIENT
Start: 2023-02-13 | End: 2023-02-15

## 2023-02-13 RX ORDER — ENOXAPARIN SODIUM 100 MG/ML
40 INJECTION SUBCUTANEOUS EVERY 12 HOURS
Refills: 0 | Status: DISCONTINUED | OUTPATIENT
Start: 2023-02-13 | End: 2023-02-15

## 2023-02-13 RX ORDER — ERGOCALCIFEROL 1.25 MG/1
1 CAPSULE ORAL
Qty: 0 | Refills: 0 | DISCHARGE

## 2023-02-13 RX ORDER — MONTELUKAST 4 MG/1
1 TABLET, CHEWABLE ORAL
Qty: 0 | Refills: 0 | DISCHARGE

## 2023-02-13 RX ORDER — FENTANYL CITRATE 50 UG/ML
25 INJECTION INTRAVENOUS
Refills: 0 | Status: DISCONTINUED | OUTPATIENT
Start: 2023-02-13 | End: 2023-02-13

## 2023-02-13 RX ORDER — ALBUTEROL 90 UG/1
2 AEROSOL, METERED ORAL
Qty: 0 | Refills: 0 | DISCHARGE

## 2023-02-13 RX ORDER — DEXTROSE 50 % IN WATER 50 %
15 SYRINGE (ML) INTRAVENOUS ONCE
Refills: 0 | Status: DISCONTINUED | OUTPATIENT
Start: 2023-02-13 | End: 2023-02-15

## 2023-02-13 RX ORDER — ONDANSETRON 8 MG/1
4 TABLET, FILM COATED ORAL ONCE
Refills: 0 | Status: COMPLETED | OUTPATIENT
Start: 2023-02-13 | End: 2023-02-13

## 2023-02-13 RX ORDER — ATORVASTATIN CALCIUM 80 MG/1
1 TABLET, FILM COATED ORAL
Qty: 0 | Refills: 0 | DISCHARGE

## 2023-02-13 RX ORDER — KETOROLAC TROMETHAMINE 30 MG/ML
15 SYRINGE (ML) INJECTION EVERY 6 HOURS
Refills: 0 | Status: DISCONTINUED | OUTPATIENT
Start: 2023-02-13 | End: 2023-02-15

## 2023-02-13 RX ORDER — POTASSIUM CHLORIDE 20 MEQ
10 PACKET (EA) ORAL
Refills: 0 | Status: DISCONTINUED | OUTPATIENT
Start: 2023-02-13 | End: 2023-02-13

## 2023-02-13 RX ORDER — FENTANYL CITRATE 50 UG/ML
50 INJECTION INTRAVENOUS
Refills: 0 | Status: DISCONTINUED | OUTPATIENT
Start: 2023-02-13 | End: 2023-02-13

## 2023-02-13 RX ORDER — ACETAMINOPHEN 500 MG
1000 TABLET ORAL ONCE
Refills: 0 | Status: COMPLETED | OUTPATIENT
Start: 2023-02-14 | End: 2023-02-14

## 2023-02-13 RX ORDER — DEXTROSE 50 % IN WATER 50 %
12.5 SYRINGE (ML) INTRAVENOUS ONCE
Refills: 0 | Status: DISCONTINUED | OUTPATIENT
Start: 2023-02-13 | End: 2023-02-15

## 2023-02-13 RX ORDER — ENOXAPARIN SODIUM 100 MG/ML
40 INJECTION SUBCUTANEOUS ONCE
Refills: 0 | Status: COMPLETED | OUTPATIENT
Start: 2023-02-13 | End: 2023-02-13

## 2023-02-13 RX ORDER — PANTOPRAZOLE SODIUM 20 MG/1
40 TABLET, DELAYED RELEASE ORAL DAILY
Refills: 0 | Status: DISCONTINUED | OUTPATIENT
Start: 2023-02-13 | End: 2023-02-15

## 2023-02-13 RX ADMIN — FENTANYL CITRATE 50 MICROGRAM(S): 50 INJECTION INTRAVENOUS at 13:16

## 2023-02-13 RX ADMIN — Medication 400 MILLIGRAM(S): at 23:33

## 2023-02-13 RX ADMIN — FENTANYL CITRATE 50 MICROGRAM(S): 50 INJECTION INTRAVENOUS at 13:01

## 2023-02-13 RX ADMIN — SCOPALAMINE 1 PATCH: 1 PATCH, EXTENDED RELEASE TRANSDERMAL at 19:32

## 2023-02-13 RX ADMIN — Medication 400 MILLIGRAM(S): at 18:42

## 2023-02-13 RX ADMIN — FENTANYL CITRATE 50 MICROGRAM(S): 50 INJECTION INTRAVENOUS at 14:29

## 2023-02-13 RX ADMIN — ONDANSETRON 4 MILLIGRAM(S): 8 TABLET, FILM COATED ORAL at 13:00

## 2023-02-13 RX ADMIN — Medication 15 MILLIGRAM(S): at 15:52

## 2023-02-13 RX ADMIN — SCOPALAMINE 1 PATCH: 1 PATCH, EXTENDED RELEASE TRANSDERMAL at 07:15

## 2023-02-13 RX ADMIN — SODIUM CHLORIDE 3 MILLILITER(S): 9 INJECTION INTRAMUSCULAR; INTRAVENOUS; SUBCUTANEOUS at 22:53

## 2023-02-13 RX ADMIN — ONDANSETRON 4 MILLIGRAM(S): 8 TABLET, FILM COATED ORAL at 15:51

## 2023-02-13 RX ADMIN — SODIUM CHLORIDE 125 MILLILITER(S): 9 INJECTION, SOLUTION INTRAVENOUS at 23:36

## 2023-02-13 RX ADMIN — ENOXAPARIN SODIUM 40 MILLIGRAM(S): 100 INJECTION SUBCUTANEOUS at 07:16

## 2023-02-13 RX ADMIN — PANTOPRAZOLE SODIUM 40 MILLIGRAM(S): 20 TABLET, DELAYED RELEASE ORAL at 15:51

## 2023-02-13 RX ADMIN — FENTANYL CITRATE 50 MICROGRAM(S): 50 INJECTION INTRAVENOUS at 14:14

## 2023-02-13 NOTE — BRIEF OPERATIVE NOTE - OPERATION/FINDINGS
No hiatal hernia seen. Ante-colic, ante-gastric dual-layered hand-sewn Leonardo-en-Y gastric bypass formed over 36Fr [id number], with hemostatic staple line and negative leak test.

## 2023-02-13 NOTE — PATIENT PROFILE ADULT - INTERNATIONAL TRAVEL
· Troponin 64->70-->68  · Patient without complaints of chest pain  · EKG without acute ischemic change  · Suspect demand ischemia secondary to acute renal failure and possible infectious etiology      · Monitor for development of cardiac symptoms No

## 2023-02-13 NOTE — ASU PREOP CHECKLIST - SELECT TESTS ORDERED
T&S sent stat preop by BABATUNDE Dickinson (as per NP order)/Type and Screen/Results in MD note/POCT Blood Glucose/COVID-19

## 2023-02-13 NOTE — PATIENT PROFILE ADULT - FALL HARM RISK - UNIVERSAL INTERVENTIONS
Bed in lowest position, wheels locked, appropriate side rails in place/Call bell, personal items and telephone in reach/Instruct patient to call for assistance before getting out of bed or chair/Non-slip footwear when patient is out of bed/[place] to call system/Physically safe environment - no spills, clutter or unnecessary equipment/Purposeful Proactive Rounding/Room/bathroom lighting operational, light cord in reach

## 2023-02-13 NOTE — BRIEF OPERATIVE NOTE - NSICDXBRIEFPROCEDURE_GEN_ALL_CORE_FT
PROCEDURES:  Robot-assisted laparoscopic Leonardo-en-Y gastric bypass 13-Feb-2023 07:09:39  Osmin Doss E

## 2023-02-14 ENCOUNTER — TRANSCRIPTION ENCOUNTER (OUTPATIENT)
Age: 38
End: 2023-02-14

## 2023-02-14 LAB
A1C WITH ESTIMATED AVERAGE GLUCOSE RESULT: 6.3 % — HIGH (ref 4–5.6)
ANION GAP SERPL CALC-SCNC: 8 MMOL/L — SIGNIFICANT CHANGE UP (ref 5–17)
BASOPHILS # BLD AUTO: 0.02 K/UL — SIGNIFICANT CHANGE UP (ref 0–0.2)
BASOPHILS NFR BLD AUTO: 0.2 % — SIGNIFICANT CHANGE UP (ref 0–2)
BUN SERPL-MCNC: 8 MG/DL — SIGNIFICANT CHANGE UP (ref 7–18)
CALCIUM SERPL-MCNC: 9.2 MG/DL — SIGNIFICANT CHANGE UP (ref 8.4–10.5)
CHLORIDE SERPL-SCNC: 106 MMOL/L — SIGNIFICANT CHANGE UP (ref 96–108)
CO2 SERPL-SCNC: 25 MMOL/L — SIGNIFICANT CHANGE UP (ref 22–31)
CREAT SERPL-MCNC: 1.05 MG/DL — SIGNIFICANT CHANGE UP (ref 0.5–1.3)
EGFR: 71 ML/MIN/1.73M2 — SIGNIFICANT CHANGE UP
EOSINOPHIL # BLD AUTO: 0 K/UL — SIGNIFICANT CHANGE UP (ref 0–0.5)
EOSINOPHIL NFR BLD AUTO: 0 % — SIGNIFICANT CHANGE UP (ref 0–6)
ESTIMATED AVERAGE GLUCOSE: 134 MG/DL — HIGH (ref 68–114)
GLUCOSE BLDC GLUCOMTR-MCNC: 92 MG/DL — SIGNIFICANT CHANGE UP (ref 70–99)
GLUCOSE SERPL-MCNC: 95 MG/DL — SIGNIFICANT CHANGE UP (ref 70–99)
HCT VFR BLD CALC: 37.1 % — SIGNIFICANT CHANGE UP (ref 34.5–45)
HGB BLD-MCNC: 11.4 G/DL — LOW (ref 11.5–15.5)
IMM GRANULOCYTES NFR BLD AUTO: 0.6 % — SIGNIFICANT CHANGE UP (ref 0–0.9)
LYMPHOCYTES # BLD AUTO: 15.9 % — SIGNIFICANT CHANGE UP (ref 13–44)
LYMPHOCYTES # BLD AUTO: 2.11 K/UL — SIGNIFICANT CHANGE UP (ref 1–3.3)
MAGNESIUM SERPL-MCNC: 2.3 MG/DL — SIGNIFICANT CHANGE UP (ref 1.6–2.6)
MCHC RBC-ENTMCNC: 26.2 PG — LOW (ref 27–34)
MCHC RBC-ENTMCNC: 30.7 GM/DL — LOW (ref 32–36)
MCV RBC AUTO: 85.3 FL — SIGNIFICANT CHANGE UP (ref 80–100)
MONOCYTES # BLD AUTO: 1.14 K/UL — HIGH (ref 0–0.9)
MONOCYTES NFR BLD AUTO: 8.6 % — SIGNIFICANT CHANGE UP (ref 2–14)
NEUTROPHILS # BLD AUTO: 9.92 K/UL — HIGH (ref 1.8–7.4)
NEUTROPHILS NFR BLD AUTO: 74.7 % — SIGNIFICANT CHANGE UP (ref 43–77)
NRBC # BLD: 0 /100 WBCS — SIGNIFICANT CHANGE UP (ref 0–0)
PHOSPHATE SERPL-MCNC: 2.4 MG/DL — LOW (ref 2.5–4.5)
PLATELET # BLD AUTO: 313 K/UL — SIGNIFICANT CHANGE UP (ref 150–400)
POTASSIUM SERPL-MCNC: 4.2 MMOL/L — SIGNIFICANT CHANGE UP (ref 3.5–5.3)
POTASSIUM SERPL-SCNC: 4.2 MMOL/L — SIGNIFICANT CHANGE UP (ref 3.5–5.3)
RBC # BLD: 4.35 M/UL — SIGNIFICANT CHANGE UP (ref 3.8–5.2)
RBC # FLD: 13.8 % — SIGNIFICANT CHANGE UP (ref 10.3–14.5)
SARS-COV-2 N GENE NPH QL NAA+PROBE: NOT DETECTED
SODIUM SERPL-SCNC: 139 MMOL/L — SIGNIFICANT CHANGE UP (ref 135–145)
WBC # BLD: 13.27 K/UL — HIGH (ref 3.8–10.5)
WBC # FLD AUTO: 13.27 K/UL — HIGH (ref 3.8–10.5)

## 2023-02-14 RX ORDER — OXYCODONE HYDROCHLORIDE 5 MG/1
1 TABLET ORAL
Qty: 12 | Refills: 0
Start: 2023-02-14 | End: 2023-02-16

## 2023-02-14 RX ADMIN — Medication 400 MILLIGRAM(S): at 11:57

## 2023-02-14 RX ADMIN — Medication 1000 MILLIGRAM(S): at 12:00

## 2023-02-14 RX ADMIN — SODIUM CHLORIDE 3 MILLILITER(S): 9 INJECTION INTRAMUSCULAR; INTRAVENOUS; SUBCUTANEOUS at 05:52

## 2023-02-14 RX ADMIN — SCOPALAMINE 1 PATCH: 1 PATCH, EXTENDED RELEASE TRANSDERMAL at 18:20

## 2023-02-14 RX ADMIN — ONDANSETRON 4 MILLIGRAM(S): 8 TABLET, FILM COATED ORAL at 05:43

## 2023-02-14 RX ADMIN — ENOXAPARIN SODIUM 40 MILLIGRAM(S): 100 INJECTION SUBCUTANEOUS at 18:38

## 2023-02-14 RX ADMIN — SODIUM CHLORIDE 3 MILLILITER(S): 9 INJECTION INTRAMUSCULAR; INTRAVENOUS; SUBCUTANEOUS at 13:35

## 2023-02-14 RX ADMIN — PANTOPRAZOLE SODIUM 40 MILLIGRAM(S): 20 TABLET, DELAYED RELEASE ORAL at 13:13

## 2023-02-14 RX ADMIN — SODIUM CHLORIDE 125 MILLILITER(S): 9 INJECTION, SOLUTION INTRAVENOUS at 13:13

## 2023-02-14 RX ADMIN — ENOXAPARIN SODIUM 40 MILLIGRAM(S): 100 INJECTION SUBCUTANEOUS at 05:43

## 2023-02-14 RX ADMIN — SODIUM CHLORIDE 125 MILLILITER(S): 9 INJECTION, SOLUTION INTRAVENOUS at 18:38

## 2023-02-14 RX ADMIN — SODIUM CHLORIDE 3 MILLILITER(S): 9 INJECTION INTRAMUSCULAR; INTRAVENOUS; SUBCUTANEOUS at 22:22

## 2023-02-14 RX ADMIN — Medication 400 MILLIGRAM(S): at 05:42

## 2023-02-14 NOTE — DISCHARGE NOTE PROVIDER - NSDCCPTREATMENT_GEN_ALL_CORE_FT
PRINCIPAL PROCEDURE  Procedure: Robot-assisted laparoscopic Leonardo-en-Y gastric bypass  Findings and Treatment:

## 2023-02-14 NOTE — CHART NOTE - NSCHARTNOTEFT_GEN_A_CORE
Patient seen and examined at bedside. No acute issues since OR. Pain controlled, ambulating, no nausea/vomiting, no respiratory distress.    Vital Signs Last 24 Hrs  T(C): 36.8 (13 Feb 2023 15:58), Max: 37.2 (13 Feb 2023 12:16)  T(F): 98.3 (13 Feb 2023 15:58), Max: 99 (13 Feb 2023 12:16)  HR: 94 (13 Feb 2023 15:58) (86 - 100)  BP: 129/84 (13 Feb 2023 15:58) (105/78 - 129/84)  BP(mean): 80 (13 Feb 2023 15:00) (75 - 87)  RR: 16 (13 Feb 2023 15:58) (15 - 20)  SpO2: 98% (13 Feb 2023 15:58) (96% - 100%)    Parameters below as of 13 Feb 2023 15:58  Patient On (Oxygen Delivery Method): room air    Physical:  General: NAD  Cardio: RRR  Resp: normal resp effort  Abd: soft, nondistended, appropriately tender, dressings c/d/i    A/P: 37F s/p robotic ambrocio-en-y gastric bypass, POD0  -pain control  -ice chips  -clears in AM  -AM labs  -ambulation/IS/OOBTC  -lovenox
Focus note:   Admit with obesity (BMI 38.5 ). S/p bariatric gastric bypass 2/13.  P taking some  clear liquid diet (bariatric). Pt has discharge instruction sheet and Bariatric Surgery Nutrition Guidelines. Reviewed diet protocols, emphasis on  adequate protein/fluid intake. Discussed support groups, exercise, sleep and follow-up with TEDDY MATTSON to monitor. FLAVIO teague

## 2023-02-14 NOTE — DISCHARGE NOTE PROVIDER - NSDCMRMEDTOKEN_GEN_ALL_CORE_FT
Albuterol (Eqv-Proventil HFA) 90 mcg/inh inhalation aerosol: 2 puff(s) inhaled every 6 hours, As Needed  atorvastatin 10 mg oral tablet: 1 tab(s) orally once a day  ergocalciferol 1.25 mg (50,000 intl units) oral capsule: 1 cap(s) orally once a week  metFORMIN 500 mg oral tablet: 1 tab(s) orally 2 times a day  montelukast 10 mg oral tablet: 1 tab(s) orally once a day (at bedtime)  pantoprazole 40 mg oral delayed release tablet: 1 tab(s) orally once a day   Albuterol (Eqv-Proventil HFA) 90 mcg/inh inhalation aerosol: 2 puff(s) inhaled every 6 hours, As Needed  atorvastatin 10 mg oral tablet: 1 tab(s) orally once a day  ergocalciferol 1.25 mg (50,000 intl units) oral capsule: 1 cap(s) orally once a week  gabapentin 250 mg/5 mL oral solution: 2.5 milliliter(s) orally 2 times a day   metFORMIN 500 mg oral tablet: 1 tab(s) orally 2 times a day  montelukast 10 mg oral tablet: 1 tab(s) orally once a day (at bedtime)  omeprazole 40 mg oral delayed release capsule: 1 cap(s) orally once a day   ondansetron 4 mg oral disintegrating strip: 1 each orally every 8 hours

## 2023-02-14 NOTE — DISCHARGE NOTE PROVIDER - CARE PROVIDER_API CALL
Osmin Doss)  Surgery  [phone] [place] [address]  Phone: [phone]  Fax: [phone]  Follow Up Time: 2 weeks

## 2023-02-14 NOTE — DISCHARGE NOTE PROVIDER - HOSPITAL COURSE
37 year old female presented for pre-planned elective robot-assisted laparoscopic gastric bypass procedure for weight loss due to morbid obesity due to excess calories. She had demonstrated inability to lose weight despite calorie restriction, exercise, and lifestyle modifications. The procedure went as planned, was successful and uncomplicated, and her post-operative course was uneventful. Pain was well controlled, she tolerated her bariatric diets, she ambulated frequently, and she remained stable. She was discharged on post-operative day 1 in good condition, with close follow up, and with strict post-bariatric instructions. 37 year old female presented for pre-planned elective robot-assisted laparoscopic gastric bypass procedure for weight loss due to morbid obesity due to excess calories. She had demonstrated inability to lose weight despite calorie restriction, exercise, and lifestyle modifications. The procedure went as planned, was successful and uncomplicated, and her post-operative course was uneventful. Pain was well controlled, she tolerated her bariatric diets, she ambulated frequently, and she remained stable. She was discharged on post-operative day 2 in good condition, with close follow up, and with strict post-bariatric instructions.

## 2023-02-14 NOTE — DISCHARGE NOTE PROVIDER - NSDCFUADDINST_GEN_ALL_CORE_FT
You have had bariatric surgery for weight loss. You were given an instruction sheet from your nurse, please adhere to that plan.  You must adhere to the diet set forth by your nutrition counseling. Do not move on to the next phase until instructed to do so.  Do not lift anything heavier than 15-20 pounds for the next 4-6 weeks. You are encouraged to walk frequently.  Please take over the counter Tylenol and Motrin as needed for pain.  Please take Tylenol (acetaminophen) 650mg every 6 hours as needed for pain.  Please take Motrin (ibuprofen) 600mg every 6 hours as needed for pain.  Do not exceed more than 4000mg Tylenol (acetaminophen) in 24 hours.  Do not exceed more than 2400mg Motrin (ibuprofen) in 24 hours.    Wound Care  You have several small incisions in your abdomen that are covered with sterile tape and a gauze dressing. The gauze dressing may come off 48 hours after surgery. The sterile tape will fall off on their own within about 7 days with regular showering, there is no need to remove the tape.  You may shower normally, just let the soapy water fall over your incisions, do not scrub. Pat yourself dry, do not rub.    Medications  Please continue to take all of your home medications as previously prescribed. Crush all of the medications that you are able to crush, and dissolve in liquid.  You are to take several new medications  1. Gabapentin liquid 250mg/5mL, 2.5mL, every 12 hours, for 3 days  2. Omeprazole 40mg, once a day, for 30 days  3. Ondansetron 4mg dissolvable tablet, every 8 hours, for 7 days  4. Acetaminophen 975mg (3 325mg regular strength tablets) or 1000mg (2 500mg extra strength tablets) every 8 hours for 5 days    Follow Up  Please follow up with Dr. Doss in his office within 2 weeks of discharge. Please call to make an appointment  Please follow up with your primary care provider within 2 weeks of discharge.

## 2023-02-14 NOTE — DISCHARGE NOTE PROVIDER - NSDCFUSCHEDAPPT_GEN_ALL_CORE_FT
Brady Duque  [facility] [address] [facility]  Scheduled Appointment: 02/22/2023    Osmin Doss  [facility] [address] [facility]  Scheduled Appointment: 03/15/2023

## 2023-02-14 NOTE — PROGRESS NOTE ADULT - ATTENDING COMMENTS
Patient seen and examined by me, and discussed with the surgical team.    Subjective:  Patient reports no abdominal pain this morning.  Patient has no nausea or emesis, and pain is well-controlled.   Patient denies chest pain, shortness of breath and has been able to ambulate and void without difficulty.       Objective:  Appears comfortable, in no distress, and is sitting up in bed.    HR 70's regular.    Abdomen soft, non-tender, non-distended.  Dressings intact with some sanguineous staining, but otherwise clean and dry.  Incisions without erythema.    Lungs clear and respiratory effort non-labored.  Proper use of incentive spirometer confirmed.      Assessment/Plan:  Patient doing well POD # 1 s/p gastric bypass.  Patient has no nausea/emesis, vitals are stable and normal, pain is well controlled.     Patient may start with bariatric clear liquids as per protocol since patient is without nausea and is hemodynamically stable.      All of patient's questions and concerns addressed to patient's satisfaction and patient verbalized an understanding of the information discussed.

## 2023-02-15 ENCOUNTER — TRANSCRIPTION ENCOUNTER (OUTPATIENT)
Age: 38
End: 2023-02-15

## 2023-02-15 VITALS
RESPIRATION RATE: 16 BRPM | DIASTOLIC BLOOD PRESSURE: 67 MMHG | OXYGEN SATURATION: 95 % | SYSTOLIC BLOOD PRESSURE: 144 MMHG | HEART RATE: 77 BPM | TEMPERATURE: 99 F

## 2023-02-15 PROCEDURE — S2900: CPT

## 2023-02-15 PROCEDURE — 85025 COMPLETE CBC W/AUTO DIFF WBC: CPT

## 2023-02-15 PROCEDURE — 36415 COLL VENOUS BLD VENIPUNCTURE: CPT

## 2023-02-15 PROCEDURE — 86900 BLOOD TYPING SEROLOGIC ABO: CPT

## 2023-02-15 PROCEDURE — 80048 BASIC METABOLIC PNL TOTAL CA: CPT

## 2023-02-15 PROCEDURE — 82962 GLUCOSE BLOOD TEST: CPT

## 2023-02-15 PROCEDURE — 83735 ASSAY OF MAGNESIUM: CPT

## 2023-02-15 PROCEDURE — 84100 ASSAY OF PHOSPHORUS: CPT

## 2023-02-15 PROCEDURE — C1889: CPT

## 2023-02-15 PROCEDURE — 86901 BLOOD TYPING SEROLOGIC RH(D): CPT

## 2023-02-15 PROCEDURE — 86850 RBC ANTIBODY SCREEN: CPT

## 2023-02-15 PROCEDURE — 81025 URINE PREGNANCY TEST: CPT

## 2023-02-15 PROCEDURE — 83036 HEMOGLOBIN GLYCOSYLATED A1C: CPT

## 2023-02-15 RX ORDER — PANTOPRAZOLE SODIUM 20 MG/1
1 TABLET, DELAYED RELEASE ORAL
Qty: 0 | Refills: 0 | DISCHARGE

## 2023-02-15 RX ORDER — OMEPRAZOLE 10 MG/1
1 CAPSULE, DELAYED RELEASE ORAL
Qty: 30 | Refills: 0
Start: 2023-02-15 | End: 2023-03-16

## 2023-02-15 RX ORDER — GABAPENTIN 400 MG/1
2.5 CAPSULE ORAL
Qty: 15 | Refills: 0
Start: 2023-02-15 | End: 2023-02-17

## 2023-02-15 RX ORDER — ONDANSETRON 8 MG/1
1 TABLET, FILM COATED ORAL
Qty: 21 | Refills: 0
Start: 2023-02-15 | End: 2023-02-21

## 2023-02-15 RX ADMIN — ENOXAPARIN SODIUM 40 MILLIGRAM(S): 100 INJECTION SUBCUTANEOUS at 06:11

## 2023-02-15 RX ADMIN — SODIUM CHLORIDE 3 MILLILITER(S): 9 INJECTION INTRAMUSCULAR; INTRAVENOUS; SUBCUTANEOUS at 05:18

## 2023-02-15 NOTE — PROGRESS NOTE ADULT - ATTENDING COMMENTS
Patient seen and examined by me, and discussed with the surgical team.    Subjective:  Patient reports no abdominal pain this morning.  Patient has no nausea or emesis, and pain is well-controlled.   Patient denies chest pain, shortness of breath and has been able to ambulate and void without difficulty.       Objective:  Appears comfortable, in no distress, and is sitting up in bed.    HR 70's regular.    Abdomen soft, non-tender, non-distended.  Dressings intact with some sanguineous staining, but otherwise clean and dry.  Incisions without erythema.    Lungs clear and respiratory effort non-labored.  Proper use of incentive spirometer confirmed.      Assessment/Plan:  Patient doing well POD # 1 s/p gastric bypass.  Patient has no nausea/emesis, vitals are stable and normal, pain is well controlled.     Patient may start with bariatric clear liquids as per protocol since patient is without nausea and is hemodynamically stable.      All of patient's questions and concerns addressed to patient's satisfaction and patient verbalized an understanding of the information discussed. Patient seen and examined by me, and discussed with the surgical house staff.   No events overnight.     Patient reports feeling well and better overall, and has no specific complaints at this time.  Patient has no abdominal pain, nausea or emesis, and pain is controlled.  Patient has been ambulating and tolerating liquids as per protocol without difficulty.  Patient denies abdominal pain, chest pain or shortness of breath.       Upon physical exam, patient appeared comfortable, HR 70's, abdomen soft, non-tender, non-distended.  Incisional dressing are intact and incisions are dry and without erythema.      Post-op manual was given to and reviewed with patient, including post-operative diet and diet progression, activity and restrictions, wound care, precautions and follow-up instructions.     As patient is able to tolerate clear liquids, remains clinically stable, and has no complaints of pain, patient is clinically appropriate for discharge.   Post-operative diet, follow-up, restrictions and precautions again reinforced.  Patient will be seeing me in follow next week.     All of patient's questions and concerns addressed to patient’s satisfaction and patient verbalized an understanding of the information discussed.

## 2023-02-15 NOTE — DISCHARGE NOTE NURSING/CASE MANAGEMENT/SOCIAL WORK - PATIENT PORTAL LINK FT
You can access the FollowMyHealth Patient Portal offered by [facility] by registering at the following website: [email or web address]. By joining [employer]’s FollowMyHealth portal, you will also be able to view your health information using other applications (apps) compatible with our system.

## 2023-02-15 NOTE — PROGRESS NOTE ADULT - SUBJECTIVE AND OBJECTIVE BOX
Patient seen and examined at bedside, POD2 robotic-assisted laparoscopic ambrocio-en-y gastric bypass for morbid obesity. Pt is without complaint this morning, was able to tolerate bariatric clears yesterday, reported some sticking sensation in her throat when attempting to take the broth initially, which has resolved. No nausea and vomiting. No pain. Endorsed some burping yesterday which persists but is improving, and passing flatus. Continues to ambulate frequently.    General: well appearing, no acute distress  Resp: non-labored, normal work of breathing  CV: regular rate and rhythm, no m/r/g  Abd: soft, minimally tender over incision sites, port site dressings clean with minimal strikethrough unchanged in past 24 hours, and dry, nondistended  Ext: FROM all four extremities, peripheral pulses palpable    Vital Signs Last 24 Hrs  T(C): 37.1 (15 Feb 2023 05:51), Max: 37.2 (15 Feb 2023 00:30)  T(F): 98.8 (15 Feb 2023 05:51), Max: 99 (15 Feb 2023 00:30)  HR: 77 (15 Feb 2023 05:51) (70 - 88)  BP: 144/67 (15 Feb 2023 05:51) (107/45 - 144/67)  BP(mean): 81 (15 Feb 2023 05:51) (59 - 81)  RR: 16 (15 Feb 2023 05:51) (16 - 18)  SpO2: 95% (15 Feb 2023 05:51) (94% - 99%)    Parameters below as of 15 Feb 2023 05:51  Patient On (Oxygen Delivery Method): room air    Assessment  37F POD2 robotic gastric bypass recovering well, tolerating diet    Plan  - c/w bariatric diet  - discharge home with follow up and bariatric instructions  - lovenox 40bid to start today dvt ppx  - c/w pain control regimen  - ambulate frequently, OOBTC and IS  - crush all home meds that can be crushed
Patient seen and examined at bedside, POD1 robotic-assisted laparoscopic ambrocio-en-y gastric bypass for morbid obesity. Pt is without complaint this morning, minimal pain well controlled with medications. Endorsed some burping, and small amount of emesis last night not associated with PO, tolerated ice chips after the emesis without issue. Ambulating frequently overnight.    Vitals: stable and afebrile  General: well appearing, no acute distress  Resp: non-labored, normal work of breathing  CV: regular rate and rhythm, no m/r/g  Abd: soft, minimally tender over incision sites, port site dressings clean and dry, nondistended  Ext: FROM all four extremities, peripheral pulses palpable    Vital Signs Last 24 Hrs  T(C): 36.8 (14 Feb 2023 05:12), Max: 37.2 (13 Feb 2023 12:16)  T(F): 98.3 (14 Feb 2023 05:12), Max: 99 (13 Feb 2023 12:16)  HR: 71 (14 Feb 2023 05:12) (71 - 100)  BP: 100/65 (14 Feb 2023 05:12) (100/64 - 145/54)  BP(mean): 75 (14 Feb 2023 00:03) (75 - 87)  RR: 18 (14 Feb 2023 05:12) (15 - 20)  SpO2: 97% (14 Feb 2023 05:12) (96% - 100%)    Parameters below as of 14 Feb 2023 05:12  Patient On (Oxygen Delivery Method): room air    Assessment  37F POD1 robotic gastric bypass recovering without concern for leak or bleed, pain is well controlled and ambulating frequently.    Plan  - advance to bariatric clears, monitor tolerance  - lovenox 40bid to start today dvt ppx  - c/w pain control regimen  - ambulate frequently  - crush all home meds that can be crushed  - IS  - discharge planning

## 2023-02-16 ENCOUNTER — NON-APPOINTMENT (OUTPATIENT)
Age: 38
End: 2023-02-16

## 2023-02-23 ENCOUNTER — APPOINTMENT (OUTPATIENT)
Dept: SURGERY | Facility: CLINIC | Age: 38
End: 2023-02-23
Payer: COMMERCIAL

## 2023-02-23 VITALS
HEART RATE: 98 BPM | BODY MASS INDEX: 35.63 KG/M2 | WEIGHT: 227 LBS | SYSTOLIC BLOOD PRESSURE: 107 MMHG | HEIGHT: 67 IN | DIASTOLIC BLOOD PRESSURE: 65 MMHG

## 2023-02-23 PROCEDURE — 99024 POSTOP FOLLOW-UP VISIT: CPT

## 2023-02-23 RX ORDER — OMEPRAZOLE 40 MG/1
40 CAPSULE, DELAYED RELEASE ORAL
Qty: 30 | Refills: 3 | Status: ACTIVE | COMMUNITY
Start: 2023-02-23 | End: 1900-01-01

## 2023-02-23 RX ORDER — PANTOPRAZOLE 40 MG/1
40 TABLET, DELAYED RELEASE ORAL
Qty: 90 | Refills: 3 | Status: COMPLETED | COMMUNITY
Start: 2022-10-10 | End: 2023-02-23

## 2023-02-23 RX ORDER — FLUCONAZOLE 150 MG/1
150 TABLET ORAL
Qty: 1 | Refills: 0 | Status: COMPLETED | COMMUNITY
Start: 2022-11-10 | End: 2023-02-23

## 2023-02-23 RX ORDER — ATORVASTATIN CALCIUM 10 MG/1
10 TABLET, FILM COATED ORAL
Qty: 30 | Refills: 0 | Status: COMPLETED | COMMUNITY
Start: 2022-07-22 | End: 2023-02-23

## 2023-02-23 RX ORDER — METFORMIN HYDROCHLORIDE 500 MG/1
500 TABLET, COATED ORAL
Qty: 90 | Refills: 0 | Status: COMPLETED | COMMUNITY
Start: 2022-02-04 | End: 2023-02-23

## 2023-02-23 RX ORDER — MONTELUKAST 10 MG/1
10 TABLET, FILM COATED ORAL
Qty: 30 | Refills: 0 | Status: COMPLETED | COMMUNITY
Start: 2022-11-13 | End: 2023-02-23

## 2023-02-23 RX ORDER — TRANEXAMIC ACID 650 MG/1
650 TABLET ORAL
Qty: 30 | Refills: 0 | Status: COMPLETED | COMMUNITY
Start: 2022-11-09 | End: 2023-02-23

## 2023-02-23 RX ORDER — ERGOCALCIFEROL 1.25 MG/1
1.25 MG CAPSULE, LIQUID FILLED ORAL
Qty: 4 | Refills: 0 | Status: COMPLETED | COMMUNITY
Start: 2022-08-09 | End: 2023-02-23

## 2023-02-23 NOTE — PHYSICAL EXAM
[de-identified] : Normal, PERRL, EOMI, NO conjunctival infections  [de-identified] : Breath sounds equal and bilateral, no wheezing no rales or rhonchi  [de-identified] :  good S1, S2, no murmurs, rubs, gallops bilateral  [de-identified] : Normoactive bowel sounds, soft and nontender, no hepatosplenomegaly or masses noted. Incision sites are healing well

## 2023-02-23 NOTE — REASON FOR VISIT
[Gastric By-pass] : gastric by-pass [de-identified] : 02/13/2023 [de-identified] : Robotic-assisted gastric bypass on 02/13/2023

## 2023-02-23 NOTE — HISTORY OF PRESENT ILLNESS
[Procedure: ___] : Procedure performed: [unfilled]  [Phase 2] : Phase 2 [Walking] : walking [Date of Surgery: ___] : Date of Surgery:   [unfilled] [Surgeon Name:   ___] : Surgeon Name: Dr. LEE [Pre-Op Weight ___] : Pre-op weight was [unfilled] lbs [de-identified] : Today patient is doing well, offers no complaints. Denies any fevers, chills, nausea, vomiting, diarrhea or constipation. Patient able to tolerate stage 2 diet with normal bowel movements. Surgical incisions are healing well. No sign of inflammation or exudate.Patient compliant with medications and vitamins  (MVI, Calcium and vitamin D, and vitamin B complex ), drinks enough fluids  ( 64 oz daily). Patient denies any pain or discomfort at present time. Patient's questions and concerns addressed to patient's satisfaction. Patient has no recent ER visit post surgery

## 2023-02-23 NOTE — ASSESSMENT
[de-identified] : KRZYSZTOF HOWELL is a 37 year old female who underwent a Robotic assisted Laparoscopic Gastric Bypass on 02/13/2023\par On oral medication and vitamins will start this visit. Feels well\par Pre-op weight: 252 lbs\par Ideal body weight: 135 lbs\par Today's weight: 227 lbs. Today's BMI : 35.55 kg/m2\par Excess body weight: 117 lbs\par total weight loss since surgery: 25 lbs\par % of Excess Body Weight Loss: 21 % which is on target. \par - Stay well hydrated.\par - Reinforced need for daily MVI, vit D, and vit B 12.\par - Reinforced need for adequate hydration (at least 64 oz daily) and adequate protein intake (at least 60 g daily)\par - Reinforced need to chew food properly before swallowing. \par - Instructed patient not to eat and drink at the same time and to space them out 30 minutes apart. \par - Instructed patient not to drink from a straw, chew gum, or drink carbonated beverages.\par - Informed patient about post-op support groups held every 3rd Tuesday of the month.\par - Please continue with  stage 2 solid food at this time. \par - Informed ON restrictions with exercise at this time. \par - Actigall for 6 month will be started next visit\par - Omeprazole for 3 month started at [facility]\par - Follow-up in 3 weeks\par - Call with concerns. \par - Follow-up blood work 3 month postop \par

## 2023-03-02 ENCOUNTER — NON-APPOINTMENT (OUTPATIENT)
Age: 38
End: 2023-03-02

## 2023-03-16 ENCOUNTER — APPOINTMENT (OUTPATIENT)
Dept: SURGERY | Facility: CLINIC | Age: 38
End: 2023-03-16
Payer: COMMERCIAL

## 2023-03-16 VITALS
DIASTOLIC BLOOD PRESSURE: 79 MMHG | SYSTOLIC BLOOD PRESSURE: 119 MMHG | BODY MASS INDEX: 33.9 KG/M2 | WEIGHT: 216 LBS | HEART RATE: 96 BPM | OXYGEN SATURATION: 98 % | HEIGHT: 67 IN

## 2023-03-16 PROCEDURE — 99024 POSTOP FOLLOW-UP VISIT: CPT

## 2023-03-16 NOTE — HISTORY OF PRESENT ILLNESS
[Procedure: ___] : Procedure performed: [unfilled]  [Date of Surgery: ___] : Date of Surgery:   [unfilled] [Surgeon Name:   ___] : Surgeon Name: Dr. LEE [Pre-Op Weight ___] : Pre-op weight was [unfilled] lbs [Walking] : walking [Phase 3] : Phase 3 [de-identified] : Today patient is doing well, c/o inability to tolerate water after 8 pm and stated that she would wait 40 minutes between the meal and drinking water that will help with the nausea. She denies any fevers, chills, nausea, vomiting, diarrhea or constipation. Patient able to tolerate stage 3 diet with normal bowel movements. Surgical incisions are healing well. No sign of inflammation or exudate.Patient compliant with medications and vitamins  (MVI, Calcium and vitamin D, and vitamin B 12 ), drinks enough fluids  ( 72 oz daily). Patient denies any pain or discomfort at present time. Patient's questions and concerns addressed to patient's satisfaction. Patient has no recent ER visit post surgery

## 2023-03-16 NOTE — REASON FOR VISIT
[Gastric By-pass] : gastric by-pass [de-identified] : 02/13/2023 [de-identified] : Robotic assisted Laparoscopic Gastric Bypass on 02/13/2023

## 2023-03-16 NOTE — PHYSICAL EXAM
[de-identified] : Normal, PERRL, EOMI, NO conjunctival infections  [de-identified] : Breath sounds equal and bilateral, no wheezing no rales or rhonchi  [de-identified] :  good S1, S2, no murmurs, rubs, gallops bilateral  [de-identified] : Normoactive bowel sounds, soft and nontender, no hepatosplenomegaly or masses noted. Incision sites are healing well

## 2023-05-15 NOTE — PATIENT PROFILE ADULT - NSPROPATIENTLACTATING_GEN_A_NUR
no Nosebleeds Normal Treatment: I explained this is common when taking isotretinoin. I recommended saline mist in each nostril multiple times a day. If this worsens they will contact us.

## 2023-05-30 NOTE — ASU PREOP CHECKLIST - SITE MARKED BY ANESTHESIOLOGIST
Dr. Cao at bedside to place 16fr linton catheter in gastrostomy, pt will present to clinic at 0715 tomorrow morning.    n/a

## 2023-10-18 PROBLEM — I82.409 ACUTE EMBOLISM AND THROMBOSIS OF UNSPECIFIED DEEP VEINS OF UNSPECIFIED LOWER EXTREMITY: Chronic | Status: ACTIVE | Noted: 2023-02-03

## 2023-10-18 PROBLEM — J30.2 OTHER SEASONAL ALLERGIC RHINITIS: Chronic | Status: ACTIVE | Noted: 2023-02-03

## 2023-10-18 PROBLEM — J45.909 UNSPECIFIED ASTHMA, UNCOMPLICATED: Chronic | Status: ACTIVE | Noted: 2023-02-03

## 2023-10-18 PROBLEM — G47.33 OBSTRUCTIVE SLEEP APNEA (ADULT) (PEDIATRIC): Chronic | Status: ACTIVE | Noted: 2023-02-03

## 2023-10-18 PROBLEM — K21.9 GASTRO-ESOPHAGEAL REFLUX DISEASE WITHOUT ESOPHAGITIS: Chronic | Status: ACTIVE | Noted: 2023-02-03

## 2023-11-01 ENCOUNTER — APPOINTMENT (OUTPATIENT)
Dept: SURGERY | Facility: CLINIC | Age: 38
End: 2023-11-01
Payer: MEDICAID

## 2023-11-01 VITALS
DIASTOLIC BLOOD PRESSURE: 68 MMHG | SYSTOLIC BLOOD PRESSURE: 110 MMHG | HEART RATE: 89 BPM | HEIGHT: 67 IN | OXYGEN SATURATION: 98 % | WEIGHT: 166 LBS | RESPIRATION RATE: 16 BRPM | BODY MASS INDEX: 26.06 KG/M2

## 2023-11-01 DIAGNOSIS — Z98.84 BARIATRIC SURGERY STATUS: ICD-10-CM

## 2023-11-01 PROCEDURE — 99213 OFFICE O/P EST LOW 20 MIN: CPT

## 2024-06-04 ENCOUNTER — APPOINTMENT (OUTPATIENT)
Dept: NEUROLOGY | Facility: CLINIC | Age: 39
End: 2024-06-04

## 2025-04-10 NOTE — H&P PST ADULT - ALLERGY TYPES
complications/morbidity: [place] is having various abdominal complaints, some of which may be attributed to her previous hernia pair and many of which are unlikely such as pain and cramping after eating and other GI disturbances.  She unfortunately does not have a primary care doctor to help coordinate her workup, but I encouraged her to obtain a PCP and potentially a GI referral as she may need further GI workup for some of her complaints.    As far as the hernia, she does not have any evidence of recurrence on exam or on imaging, though she does have some hard tissue which may be scar versus mesh underneath her previous surgical site.  I will refer her to Dr. Farris to evaluate, specifically to see if mesh excision is warranted in this situation or if there is any other insight regarding her chronic abdominal pain.    Continue with current prescription medications    DISPOSITION:  to Dr Farris    My findings will be relayed to consulting practitioner or PCP via Epic note    Note completed using dictation software, please excuse any errors.    Electronically signed by Luther Porras MD on 4/10/2025 at 10:29 AM   outdoor environmental allergies

## 2025-05-23 ENCOUNTER — APPOINTMENT (OUTPATIENT)
Dept: OBGYN | Facility: CLINIC | Age: 40
End: 2025-05-23
Payer: COMMERCIAL

## 2025-05-23 ENCOUNTER — LABORATORY RESULT (OUTPATIENT)
Age: 40
End: 2025-05-23

## 2025-05-23 VITALS
TEMPERATURE: 97.9 F | WEIGHT: 155 LBS | BODY MASS INDEX: 24.28 KG/M2 | RESPIRATION RATE: 16 BRPM | OXYGEN SATURATION: 100 % | HEART RATE: 79 BPM | SYSTOLIC BLOOD PRESSURE: 100 MMHG | DIASTOLIC BLOOD PRESSURE: 67 MMHG

## 2025-05-23 DIAGNOSIS — Z86.711 PERSONAL HISTORY OF PULMONARY EMBOLISM: ICD-10-CM

## 2025-05-23 DIAGNOSIS — Z30.9 ENCOUNTER FOR CONTRACEPTIVE MANAGEMENT, UNSPECIFIED: ICD-10-CM

## 2025-05-23 DIAGNOSIS — Z01.419 ENCOUNTER FOR GYNECOLOGICAL EXAMINATION (GENERAL) (ROUTINE) W/OUT ABNORMAL FINDINGS: ICD-10-CM

## 2025-05-23 PROCEDURE — 99459 PELVIC EXAMINATION: CPT

## 2025-05-23 PROCEDURE — 99385 PREV VISIT NEW AGE 18-39: CPT

## 2025-05-23 RX ORDER — NORETHINDRONE 0.35 MG/1
0.35 TABLET ORAL DAILY
Qty: 3 | Refills: 3 | Status: ACTIVE | COMMUNITY
Start: 2025-05-23 | End: 1900-01-01

## 2025-05-26 LAB — HPV HIGH+LOW RISK DNA PNL CVX: DETECTED

## 2025-05-27 LAB
C TRACH RRNA SPEC QL NAA+PROBE: NOT DETECTED
N GONORRHOEA RRNA SPEC QL NAA+PROBE: NOT DETECTED
SOURCE TP AMPLIFICATION: NORMAL

## 2025-05-30 ENCOUNTER — NON-APPOINTMENT (OUTPATIENT)
Age: 40
End: 2025-05-30

## 2025-05-30 LAB — CYTOLOGY CVX/VAG DOC THIN PREP: ABNORMAL

## 2025-06-03 ENCOUNTER — NON-APPOINTMENT (OUTPATIENT)
Age: 40
End: 2025-06-03

## (undated) DEVICE — TUBING ENDO EXT OLYMPUS 160 24HR USE

## (undated) DEVICE — SOLIDIFIER ISOLYZER 2000 CC

## (undated) DEVICE — SUT QUILL POLYPROPYLENE 1 15CM 22MM CLEAR

## (undated) DEVICE — GLV 7.5 PROTEXIS (WHITE)

## (undated) DEVICE — Device

## (undated) DEVICE — VENODYNE/SCD SLEEVE CALF MEDIUM

## (undated) DEVICE — FORCEP RADIAL JAW 4 W NDL 2.2MM 2.8MM 240CM ORANGE DISP

## (undated) DEVICE — FORMALIN CONTAINER MED

## (undated) DEVICE — SUT PDS II PLUS 3-0 27" SH

## (undated) DEVICE — BITE BLOCK ADULT 20 X 27MM (GREEN)

## (undated) DEVICE — KIT ENDO PROCEDURE CUST W/VLV

## (undated) DEVICE — SOL INJ NS 0.9% 500ML 1-PORT

## (undated) DEVICE — XI VESSEL SEALER

## (undated) DEVICE — GLV 7 PROTEXIS (WHITE)

## (undated) DEVICE — TUBING CANNULA SALTER LABS NASAL ADULT 7FT

## (undated) DEVICE — APPLICATOR FOR TISSEEL FLEX TIP 360 W SNAP LOCK 40CM

## (undated) DEVICE — GLV 6.5 PROTEXIS (WHITE)

## (undated) DEVICE — XI STAPLER SUREFORM 60

## (undated) DEVICE — MASK OXYGEN PANORAMIC